# Patient Record
Sex: FEMALE | Race: WHITE | Employment: FULL TIME | URBAN - METROPOLITAN AREA
[De-identification: names, ages, dates, MRNs, and addresses within clinical notes are randomized per-mention and may not be internally consistent; named-entity substitution may affect disease eponyms.]

---

## 2017-12-25 ENCOUNTER — APPOINTMENT (OUTPATIENT)
Dept: GENERAL RADIOLOGY | Facility: HOSPITAL | Age: 37
End: 2017-12-25
Attending: EMERGENCY MEDICINE
Payer: COMMERCIAL

## 2017-12-25 ENCOUNTER — HOSPITAL ENCOUNTER (EMERGENCY)
Facility: HOSPITAL | Age: 37
Discharge: HOME | End: 2017-12-25
Attending: EMERGENCY MEDICINE
Payer: COMMERCIAL

## 2017-12-25 VITALS
HEART RATE: 91 BPM | SYSTOLIC BLOOD PRESSURE: 140 MMHG | TEMPERATURE: 99 F | OXYGEN SATURATION: 98 % | DIASTOLIC BLOOD PRESSURE: 86 MMHG | WEIGHT: 171 LBS | HEIGHT: 64 IN | BODY MASS INDEX: 29.19 KG/M2 | RESPIRATION RATE: 18 BRPM

## 2017-12-25 DIAGNOSIS — J45.21 MILD INTERMITTENT ASTHMA WITH EXACERBATION: Primary | ICD-10-CM

## 2017-12-25 PROCEDURE — 71020 XR CHEST PA + LAT CHEST (CPT=71020): CPT | Performed by: EMERGENCY MEDICINE

## 2017-12-25 PROCEDURE — 94640 AIRWAY INHALATION TREATMENT: CPT

## 2017-12-25 PROCEDURE — 99284 EMERGENCY DEPT VISIT MOD MDM: CPT

## 2017-12-25 RX ORDER — LEVALBUTEROL TARTRATE 45 UG/1
AEROSOL, METERED ORAL EVERY 4 HOURS PRN
COMMUNITY
End: 2020-01-17

## 2017-12-25 RX ORDER — LEVALBUTEROL TARTRATE 45 UG/1
1 AEROSOL, METERED ORAL EVERY 6 HOURS PRN
Qty: 1 INHALER | Refills: 0 | Status: SHIPPED | OUTPATIENT
Start: 2017-12-25 | End: 2020-01-17

## 2017-12-25 RX ORDER — IPRATROPIUM BROMIDE AND ALBUTEROL SULFATE 2.5; .5 MG/3ML; MG/3ML
3 SOLUTION RESPIRATORY (INHALATION) ONCE
Status: COMPLETED | OUTPATIENT
Start: 2017-12-25 | End: 2017-12-25

## 2017-12-25 RX ORDER — PREDNISONE 20 MG/1
40 TABLET ORAL DAILY
Qty: 10 TABLET | Refills: 0 | Status: SHIPPED | OUTPATIENT
Start: 2017-12-25 | End: 2017-12-30

## 2017-12-25 RX ORDER — FLUOXETINE 10 MG/1
10 CAPSULE ORAL DAILY
COMMUNITY
End: 2020-05-21 | Stop reason: ALTCHOICE

## 2017-12-25 NOTE — ED INITIAL ASSESSMENT (HPI)
Patient reports increased shortness of breath since last Wednesday along with a productive cough. Denies fever, N/V/D. Patient relates inhaler use today ineffective.

## 2017-12-25 NOTE — ED PROVIDER NOTES
Patient Seen in: [facility] Emergency Department    History   Patient presents with:  Dyspnea LIZ SOB (respiratory)    Stated Complaint: asthma, sob    HPI    35-year-old white female with a history of asthma presents emergently for complaint of dif clear.  Sclerae anicteric. Neck is supple. Lungs are diminished with diffuse scattered wheezes to auscultation bilaterally.   Heart is regular rate and rhythm without murmur gallop or rub    Abdomen is soft nondistended nontender to deep palpation there

## 2019-03-12 ENCOUNTER — LAB ENCOUNTER (OUTPATIENT)
Dept: LAB | Age: 39
End: 2019-03-12
Attending: PHYSICIAN ASSISTANT
Payer: COMMERCIAL

## 2019-03-12 DIAGNOSIS — L30.9 DERMATITIS: ICD-10-CM

## 2019-03-12 PROCEDURE — 87070 CULTURE OTHR SPECIMN AEROBIC: CPT

## 2019-03-12 PROCEDURE — 87205 SMEAR GRAM STAIN: CPT

## 2019-03-12 PROCEDURE — 87077 CULTURE AEROBIC IDENTIFY: CPT

## 2019-05-14 PROCEDURE — 88175 CYTOPATH C/V AUTO FLUID REDO: CPT | Performed by: OBSTETRICS & GYNECOLOGY

## 2019-05-14 PROCEDURE — 87624 HPV HI-RISK TYP POOLED RSLT: CPT | Performed by: OBSTETRICS & GYNECOLOGY

## 2021-07-21 PROBLEM — Z91.89 AT HIGH RISK FOR BREAST CANCER: Status: ACTIVE | Noted: 2021-07-21

## 2021-08-26 PROBLEM — O09.529 ANTEPARTUM MULTIGRAVIDA OF ADVANCED MATERNAL AGE: Status: ACTIVE | Noted: 2021-08-26

## 2021-08-26 PROBLEM — O09.819 PREGNANCY RESULTING FROM ASSISTED REPRODUCTIVE TECHNOLOGY, ANTEPARTUM: Status: ACTIVE | Noted: 2021-08-26

## 2021-11-09 NOTE — PROGRESS NOTES
Outpatient Maternal-Fetal Medicine Consultation    Dear Dr. Sampson Clark,    Thank you for requesting ultrasound evaluation and maternal fetal medicine consultation on your patient Matt Curiel.   As you are aware she is a 39year old female with a [place] Erythromycin              Minocycline                   PHYSICAL EXAMINATION:  /81 (BP Location: Left arm, Patient Position: Sitting, Cuff Size: large)   Pulse 73   Ht 5' 4\" (1.626 m)   Wt 178 lb (80.7 kg)   LMP 07/01/2021   BMI 30.55 kg/m²   Gene more than adequate to assess for gestational diabetes and preeclampsia; hence, no further significant alterations in obstetric care are advised.     Medical Complications    Women 28years of age or older can expect to experience two to three fold higher ra genetic amniocentesis). Non-invasive Pregnancy Testing (NIPT) - we reviewed her low risk cell free DNA screen. We discussed her low risk cell free DNA screen and her normal level II ultrasound today.   We discussed her option for amniocentesis but that SNRIs during pregnancy be individualized; treatment of depression during pregnancy should incorporate the clinical expertise of the mental health clinician, obstetrician, primary healthcare provider, and pediatrician (ACOG, 2007).  The ACOG also recommend t increased rate of first trimester spontaneous . The risk of complications during pregnancy is lower in women with subclinical, rather than overt hypothyroidism.  However, in some studies, women with subclinical hypothyroidism were also reported to ultrasound in the third trimester and as needed to assess growth and potential goiter. Her TSH was 0.47 on 9/11/2021. She is continuing to take levothyroxine 100 mcg daily.     Assisted Reproductive technology -   Conception by IVF is associated with ( mg daily)  · Fetal echocardiogram in 4 weeks  · Monitor TSH every 8 weeks or more frequently if dose adjustments are required    Thank you for allowing me to participate in the care of your patient.   Please do not hesitate to contact me if addition

## 2021-11-16 ENCOUNTER — ULTRASOUND ENCOUNTER (OUTPATIENT)
Dept: PERINATAL CARE | Facility: HOSPITAL | Age: 41
End: 2021-11-16
Attending: OBSTETRICS & GYNECOLOGY
Payer: COMMERCIAL

## 2021-11-16 VITALS
HEART RATE: 73 BPM | WEIGHT: 178 LBS | BODY MASS INDEX: 30.39 KG/M2 | SYSTOLIC BLOOD PRESSURE: 124 MMHG | HEIGHT: 64 IN | DIASTOLIC BLOOD PRESSURE: 81 MMHG

## 2021-11-16 DIAGNOSIS — O09.819 ENCOUNTER FOR SUPERVISION OF PREGNANCY RESULTING FROM ASSISTED REPRODUCTIVE TECHNOLOGY: ICD-10-CM

## 2021-11-16 DIAGNOSIS — F33.40 RECURRENT MAJOR DEPRESSIVE DISORDER, IN REMISSION (HCC): ICD-10-CM

## 2021-11-16 DIAGNOSIS — O09.529 ANTEPARTUM MULTIGRAVIDA OF ADVANCED MATERNAL AGE: Primary | ICD-10-CM

## 2021-11-16 DIAGNOSIS — O09.819 PREGNANCY RESULTING FROM ASSISTED REPRODUCTIVE TECHNOLOGY, ANTEPARTUM: ICD-10-CM

## 2021-11-16 DIAGNOSIS — E03.9 HYPOTHYROIDISM, UNSPECIFIED TYPE: ICD-10-CM

## 2021-11-16 DIAGNOSIS — O09.529 ANTEPARTUM MULTIGRAVIDA OF ADVANCED MATERNAL AGE: ICD-10-CM

## 2021-11-16 PROCEDURE — 76811 OB US DETAILED SNGL FETUS: CPT | Performed by: OBSTETRICS & GYNECOLOGY

## 2021-11-16 PROCEDURE — 99244 OFF/OP CNSLTJ NEW/EST MOD 40: CPT | Performed by: OBSTETRICS & GYNECOLOGY

## 2021-12-13 ENCOUNTER — OFFICE VISIT (OUTPATIENT)
Dept: PERINATAL CARE | Facility: HOSPITAL | Age: 41
End: 2021-12-13
Attending: OBSTETRICS & GYNECOLOGY
Payer: COMMERCIAL

## 2021-12-13 VITALS
BODY MASS INDEX: 31.41 KG/M2 | HEART RATE: 76 BPM | DIASTOLIC BLOOD PRESSURE: 90 MMHG | WEIGHT: 184 LBS | HEIGHT: 64 IN | SYSTOLIC BLOOD PRESSURE: 142 MMHG

## 2021-12-13 DIAGNOSIS — O09.529 ANTEPARTUM MULTIGRAVIDA OF ADVANCED MATERNAL AGE: ICD-10-CM

## 2021-12-13 DIAGNOSIS — O09.819 PREGNANCY RESULTING FROM ASSISTED REPRODUCTIVE TECHNOLOGY, ANTEPARTUM: ICD-10-CM

## 2021-12-13 DIAGNOSIS — O09.819 PREGNANCY RESULTING FROM ASSISTED REPRODUCTIVE TECHNOLOGY, ANTEPARTUM: Primary | ICD-10-CM

## 2021-12-13 PROCEDURE — 93325 DOPPLER ECHO COLOR FLOW MAPG: CPT

## 2021-12-13 PROCEDURE — 76825 ECHO EXAM OF FETAL HEART: CPT | Performed by: OBSTETRICS & GYNECOLOGY

## 2021-12-13 PROCEDURE — 76827 ECHO EXAM OF FETAL HEART: CPT | Performed by: OBSTETRICS & GYNECOLOGY

## 2021-12-13 PROCEDURE — 76827 ECHO EXAM OF FETAL HEART: CPT

## 2021-12-13 PROCEDURE — 99214 OFFICE O/P EST MOD 30 MIN: CPT | Performed by: OBSTETRICS & GYNECOLOGY

## 2021-12-13 PROCEDURE — 93325 DOPPLER ECHO COLOR FLOW MAPG: CPT | Performed by: OBSTETRICS & GYNECOLOGY

## 2021-12-13 NOTE — PROGRESS NOTES
Outpatient Maternal-Fetal Medicine Consultation    Dear Dr. Patricio Cosme,    Thank you for requesting ultrasound evaluation and maternal fetal medicine consultation on your patient Doug Lines.   As you are aware she is a 39year old female with a [place] Minocycline                   PHYSICAL EXAMINATION:  /82 (BP Location: Left arm, Patient Position: Sitting, Cuff Size: large)   Pulse 75   Ht 5' 4\" (1.626 m)   Wt 184 lb (83.5 kg)   LMP 07/01/2021   BMI 31.58 kg/m²   General: alert and oriente at increased risk of delivering offspring with congenital malformations compared with fertile women who conceive naturally. Heart defects have been reported as high at 6 % so fetal echocardiography is recommended in all IVF patients.  Stillbirth and perinat valves appear normal.  There is no evidence of pericardial or pleural effusion. The A-V conduction is one to one and the heart rate is appropriate for gestational age. No evidence of fetal arrhythmias is seen during today's study.   There is a right to [employer]

## 2021-12-13 NOTE — PROGRESS NOTES
Pt here for fetal echo  +FM  Pt reports nausea, hip pain and tingling of her hands bilaterally when she wakes up

## 2021-12-28 PROBLEM — E03.9 HYPOTHYROIDISM: Status: ACTIVE | Noted: 2021-12-28

## 2022-01-24 ENCOUNTER — HOSPITAL ENCOUNTER (INPATIENT)
Facility: HOSPITAL | Age: 42
LOS: 4 days | Discharge: HOME | End: 2022-01-28
Attending: OBSTETRICS & GYNECOLOGY | Admitting: OBSTETRICS & GYNECOLOGY
Payer: COMMERCIAL

## 2022-01-24 ENCOUNTER — ANESTHESIA EVENT (OUTPATIENT)
Dept: OBGYN UNIT | Facility: HOSPITAL | Age: 42
End: 2022-01-24
Payer: COMMERCIAL

## 2022-01-24 ENCOUNTER — ANESTHESIA (OUTPATIENT)
Dept: OBGYN UNIT | Facility: HOSPITAL | Age: 42
End: 2022-01-24
Payer: COMMERCIAL

## 2022-01-24 PROBLEM — O14.93 PREECLAMPSIA, THIRD TRIMESTER: Status: ACTIVE | Noted: 2022-01-24

## 2022-01-24 PROBLEM — Z34.90 PREGNANCY: Status: ACTIVE | Noted: 2022-01-24

## 2022-01-24 LAB
ALBUMIN SERPL-MCNC: 2.6 G/DL (ref 3.4–5)
ALBUMIN/GLOB SERPL: 0.7 {RATIO} (ref 1–2)
ALP LIVER SERPL-CCNC: 96 U/L
ALT SERPL-CCNC: 20 U/L
ANION GAP SERPL CALC-SCNC: 9 MMOL/L (ref 0–18)
ANTIBODY SCREEN: NEGATIVE
AST SERPL-CCNC: 25 U/L (ref 15–37)
BASOPHILS # BLD AUTO: 0.05 X10(3) UL (ref 0–0.2)
BASOPHILS NFR BLD AUTO: 0.4 %
BILIRUB SERPL-MCNC: 0.2 MG/DL (ref 0.1–2)
BUN BLD-MCNC: 14 MG/DL (ref 7–18)
CALCIUM BLD-MCNC: 9.1 MG/DL (ref 8.5–10.1)
CHLORIDE SERPL-SCNC: 103 MMOL/L (ref 98–112)
CO2 SERPL-SCNC: 19 MMOL/L (ref 21–32)
CREAT BLD-MCNC: 0.77 MG/DL
CREAT UR-SCNC: 171 MG/DL
EOSINOPHIL # BLD AUTO: 0.17 X10(3) UL (ref 0–0.7)
EOSINOPHIL NFR BLD AUTO: 1.4 %
ERYTHROCYTE [DISTWIDTH] IN BLOOD BY AUTOMATED COUNT: 13 %
FASTING STATUS PATIENT QL REPORTED: NO
GLOBULIN PLAS-MCNC: 3.6 G/DL (ref 2.8–4.4)
GLUCOSE BLD-MCNC: 105 MG/DL (ref 70–99)
HCT VFR BLD AUTO: 34.7 %
HGB BLD-MCNC: 12.2 G/DL
IMM GRANULOCYTES # BLD AUTO: 0.14 X10(3) UL (ref 0–1)
IMM GRANULOCYTES NFR BLD: 1.1 %
LYMPHOCYTES # BLD AUTO: 1.83 X10(3) UL (ref 1–4)
LYMPHOCYTES NFR BLD AUTO: 14.6 %
MCH RBC QN AUTO: 31 PG (ref 26–34)
MCHC RBC AUTO-ENTMCNC: 35.2 G/DL (ref 31–37)
MCV RBC AUTO: 88.3 FL
MONOCYTES # BLD AUTO: 1.45 X10(3) UL (ref 0.1–1)
MONOCYTES NFR BLD AUTO: 11.5 %
NEODAT: NEGATIVE
NEUTROPHILS # BLD AUTO: 8.92 X10 (3) UL (ref 1.5–7.7)
NEUTROPHILS # BLD AUTO: 8.92 X10(3) UL (ref 1.5–7.7)
NEUTROPHILS NFR BLD AUTO: 71 %
OSMOLALITY SERPL CALC.SUM OF ELEC: 273 MOSM/KG (ref 275–295)
PLATELET # BLD AUTO: 248 10(3)UL (ref 150–450)
POTASSIUM SERPL-SCNC: 4 MMOL/L (ref 3.5–5.1)
PROT SERPL-MCNC: 6.2 G/DL (ref 6.4–8.2)
PROT UR-MCNC: 1207.7 MG/DL
PROT/CREAT UR-RTO: 7.06
RBC # BLD AUTO: 3.93 X10(6)UL
RH BLOOD TYPE: POSITIVE
RH BLOOD TYPE: POSITIVE
SODIUM SERPL-SCNC: 131 MMOL/L (ref 136–145)
T PALLIDUM AB SER QL IA: NONREACTIVE
URATE SERPL-MCNC: 5.6 MG/DL
WBC # BLD AUTO: 12.6 X10(3) UL (ref 4–11)

## 2022-01-24 PROCEDURE — 59514 CESAREAN DELIVERY ONLY: CPT | Performed by: OBSTETRICS & GYNECOLOGY

## 2022-01-24 RX ORDER — LABETALOL HYDROCHLORIDE 5 MG/ML
20 INJECTION, SOLUTION INTRAVENOUS ONCE AS NEEDED
Status: COMPLETED | OUTPATIENT
Start: 2022-01-24 | End: 2022-01-24

## 2022-01-24 RX ORDER — LABETALOL HYDROCHLORIDE 5 MG/ML
80 INJECTION, SOLUTION INTRAVENOUS ONCE AS NEEDED
Status: COMPLETED | OUTPATIENT
Start: 2022-01-24 | End: 2022-01-24

## 2022-01-24 RX ORDER — METOCLOPRAMIDE HYDROCHLORIDE 5 MG/ML
INJECTION INTRAMUSCULAR; INTRAVENOUS AS NEEDED
Status: DISCONTINUED | OUTPATIENT
Start: 2022-01-24 | End: 2022-01-24 | Stop reason: SURG

## 2022-01-24 RX ORDER — NALBUPHINE HCL 10 MG/ML
2.5 AMPUL (ML) INJECTION
Status: DISCONTINUED | OUTPATIENT
Start: 2022-01-24 | End: 2022-01-28

## 2022-01-24 RX ORDER — ACETAMINOPHEN 500 MG
1000 TABLET ORAL ONCE
Status: COMPLETED | OUTPATIENT
Start: 2022-01-24 | End: 2022-01-24

## 2022-01-24 RX ORDER — ASPIRIN 325 MG
120 TABLET ORAL DAILY
COMMUNITY
End: 2022-01-28

## 2022-01-24 RX ORDER — NALBUPHINE HCL 10 MG/ML
2.5 AMPUL (ML) INJECTION EVERY 4 HOURS PRN
Status: DISCONTINUED | OUTPATIENT
Start: 2022-01-24 | End: 2022-01-28

## 2022-01-24 RX ORDER — CEFAZOLIN SODIUM/WATER 2 G/20 ML
2 SYRINGE (ML) INTRAVENOUS ONCE
Status: COMPLETED | OUTPATIENT
Start: 2022-01-24 | End: 2022-01-24

## 2022-01-24 RX ORDER — ACETAMINOPHEN 500 MG
TABLET ORAL
Status: COMPLETED
Start: 2022-01-24 | End: 2022-01-24

## 2022-01-24 RX ORDER — DIPHENHYDRAMINE HYDROCHLORIDE 50 MG/ML
12.5 INJECTION INTRAMUSCULAR; INTRAVENOUS EVERY 4 HOURS PRN
Status: DISCONTINUED | OUTPATIENT
Start: 2022-01-24 | End: 2022-01-28

## 2022-01-24 RX ORDER — SODIUM CHLORIDE, SODIUM LACTATE, POTASSIUM CHLORIDE, CALCIUM CHLORIDE 600; 310; 30; 20 MG/100ML; MG/100ML; MG/100ML; MG/100ML
INJECTION, SOLUTION INTRAVENOUS CONTINUOUS
Status: DISCONTINUED | OUTPATIENT
Start: 2022-01-24 | End: 2022-01-25

## 2022-01-24 RX ORDER — NALOXONE HYDROCHLORIDE 0.4 MG/ML
0.08 INJECTION, SOLUTION INTRAMUSCULAR; INTRAVENOUS; SUBCUTANEOUS
Status: ACTIVE | OUTPATIENT
Start: 2022-01-24 | End: 2022-01-25

## 2022-01-24 RX ORDER — BETAMETHASONE SODIUM PHOSPHATE AND BETAMETHASONE ACETATE 3; 3 MG/ML; MG/ML
12 INJECTION, SUSPENSION INTRA-ARTICULAR; INTRALESIONAL; INTRAMUSCULAR; SOFT TISSUE EVERY 24 HOURS
Status: DISCONTINUED | OUTPATIENT
Start: 2022-01-24 | End: 2022-01-25

## 2022-01-24 RX ORDER — MORPHINE SULFATE 2 MG/ML
INJECTION, SOLUTION INTRAMUSCULAR; INTRAVENOUS AS NEEDED
Status: DISCONTINUED | OUTPATIENT
Start: 2022-01-24 | End: 2022-01-24 | Stop reason: SURG

## 2022-01-24 RX ORDER — KETOROLAC TROMETHAMINE 30 MG/ML
30 INJECTION, SOLUTION INTRAMUSCULAR; INTRAVENOUS ONCE AS NEEDED
Status: ACTIVE | OUTPATIENT
Start: 2022-01-24 | End: 2022-01-24

## 2022-01-24 RX ORDER — BUPIVACAINE HYDROCHLORIDE 7.5 MG/ML
INJECTION, SOLUTION INTRASPINAL AS NEEDED
Status: DISCONTINUED | OUTPATIENT
Start: 2022-01-24 | End: 2022-01-24 | Stop reason: SURG

## 2022-01-24 RX ORDER — CALCIUM GLUCONATE 94 MG/ML
1 INJECTION, SOLUTION INTRAVENOUS ONCE AS NEEDED
Status: ACTIVE | OUTPATIENT
Start: 2022-01-24 | End: 2022-01-24

## 2022-01-24 RX ORDER — PHENYLEPHRINE HCL 10 MG/ML
VIAL (ML) INJECTION AS NEEDED
Status: DISCONTINUED | OUTPATIENT
Start: 2022-01-24 | End: 2022-01-24 | Stop reason: SURG

## 2022-01-24 RX ORDER — HYDRALAZINE HYDROCHLORIDE 20 MG/ML
10 INJECTION INTRAMUSCULAR; INTRAVENOUS ONCE AS NEEDED
Status: COMPLETED | OUTPATIENT
Start: 2022-01-24 | End: 2022-01-24

## 2022-01-24 RX ORDER — SODIUM CHLORIDE, SODIUM LACTATE, POTASSIUM CHLORIDE, CALCIUM CHLORIDE 600; 310; 30; 20 MG/100ML; MG/100ML; MG/100ML; MG/100ML
INJECTION, SOLUTION INTRAVENOUS CONTINUOUS PRN
Status: DISCONTINUED | OUTPATIENT
Start: 2022-01-24 | End: 2022-01-24 | Stop reason: SURG

## 2022-01-24 RX ORDER — CALCIUM GLUCONATE 94 MG/ML
1 INJECTION, SOLUTION INTRAVENOUS ONCE AS NEEDED
Status: DISCONTINUED | OUTPATIENT
Start: 2022-01-24 | End: 2022-01-25

## 2022-01-24 RX ORDER — TRISODIUM CITRATE DIHYDRATE AND CITRIC ACID MONOHYDRATE 500; 334 MG/5ML; MG/5ML
SOLUTION ORAL
Status: COMPLETED
Start: 2022-01-24 | End: 2022-01-24

## 2022-01-24 RX ORDER — DIPHENHYDRAMINE HCL 25 MG
25 CAPSULE ORAL EVERY 4 HOURS PRN
Status: DISCONTINUED | OUTPATIENT
Start: 2022-01-24 | End: 2022-01-28

## 2022-01-24 RX ORDER — LIDOCAINE HYDROCHLORIDE 10 MG/ML
INJECTION, SOLUTION EPIDURAL; INFILTRATION; INTRACAUDAL; PERINEURAL AS NEEDED
Status: DISCONTINUED | OUTPATIENT
Start: 2022-01-24 | End: 2022-01-24 | Stop reason: SURG

## 2022-01-24 RX ORDER — ONDANSETRON 2 MG/ML
4 INJECTION INTRAMUSCULAR; INTRAVENOUS EVERY 6 HOURS PRN
Status: DISCONTINUED | OUTPATIENT
Start: 2022-01-24 | End: 2022-01-28

## 2022-01-24 RX ORDER — ONDANSETRON 2 MG/ML
4 INJECTION INTRAMUSCULAR; INTRAVENOUS ONCE AS NEEDED
Status: ACTIVE | OUTPATIENT
Start: 2022-01-24 | End: 2022-01-24

## 2022-01-24 RX ORDER — LABETALOL HYDROCHLORIDE 5 MG/ML
40 INJECTION, SOLUTION INTRAVENOUS ONCE AS NEEDED
Status: COMPLETED | OUTPATIENT
Start: 2022-01-24 | End: 2022-01-24

## 2022-01-24 RX ORDER — ONDANSETRON 2 MG/ML
INJECTION INTRAMUSCULAR; INTRAVENOUS AS NEEDED
Status: DISCONTINUED | OUTPATIENT
Start: 2022-01-24 | End: 2022-01-24 | Stop reason: SURG

## 2022-01-24 RX ORDER — EPHEDRINE SULFATE 50 MG/ML
INJECTION INTRAVENOUS AS NEEDED
Status: DISCONTINUED | OUTPATIENT
Start: 2022-01-24 | End: 2022-01-24 | Stop reason: SURG

## 2022-01-24 RX ORDER — TRISODIUM CITRATE DIHYDRATE AND CITRIC ACID MONOHYDRATE 500; 334 MG/5ML; MG/5ML
30 SOLUTION ORAL ONCE
Status: COMPLETED | OUTPATIENT
Start: 2022-01-24 | End: 2022-01-24

## 2022-01-24 RX ORDER — DEXAMETHASONE SODIUM PHOSPHATE 4 MG/ML
VIAL (ML) INJECTION AS NEEDED
Status: DISCONTINUED | OUTPATIENT
Start: 2022-01-24 | End: 2022-01-24 | Stop reason: SURG

## 2022-01-24 RX ORDER — DIPHENHYDRAMINE HYDROCHLORIDE 50 MG/ML
25 INJECTION INTRAMUSCULAR; INTRAVENOUS ONCE AS NEEDED
Status: ACTIVE | OUTPATIENT
Start: 2022-01-24 | End: 2022-01-24

## 2022-01-24 RX ORDER — SODIUM CHLORIDE, SODIUM LACTATE, POTASSIUM CHLORIDE, CALCIUM CHLORIDE 600; 310; 30; 20 MG/100ML; MG/100ML; MG/100ML; MG/100ML
125 INJECTION, SOLUTION INTRAVENOUS CONTINUOUS
Status: DISCONTINUED | OUTPATIENT
Start: 2022-01-24 | End: 2022-01-25 | Stop reason: HOSPADM

## 2022-01-24 RX ORDER — CEFAZOLIN SODIUM/WATER 2 G/20 ML
SYRINGE (ML) INTRAVENOUS
Status: DISPENSED
Start: 2022-01-24 | End: 2022-01-25

## 2022-01-24 RX ORDER — ONDANSETRON 2 MG/ML
4 INJECTION INTRAMUSCULAR; INTRAVENOUS EVERY 6 HOURS PRN
Status: DISCONTINUED | OUTPATIENT
Start: 2022-01-24 | End: 2022-01-25 | Stop reason: HOSPADM

## 2022-01-24 RX ADMIN — EPHEDRINE SULFATE 5 MG: 50 INJECTION INTRAVENOUS at 20:33:00

## 2022-01-24 RX ADMIN — CEFAZOLIN SODIUM/WATER 2 G: 2 G/20 ML SYRINGE (ML) INTRAVENOUS at 20:35:00

## 2022-01-24 RX ADMIN — BUPIVACAINE HYDROCHLORIDE 1.6 ML: 7.5 INJECTION, SOLUTION INTRASPINAL at 20:33:00

## 2022-01-24 RX ADMIN — LIDOCAINE HYDROCHLORIDE 3 ML: 10 INJECTION, SOLUTION EPIDURAL; INFILTRATION; INTRACAUDAL; PERINEURAL at 20:30:00

## 2022-01-24 RX ADMIN — DEXAMETHASONE SODIUM PHOSPHATE 4 MG: 4 MG/ML VIAL (ML) INJECTION at 20:33:00

## 2022-01-24 RX ADMIN — PHENYLEPHRINE HCL 100 MCG: 10 MG/ML VIAL (ML) INJECTION at 20:33:00

## 2022-01-24 RX ADMIN — SODIUM CHLORIDE, SODIUM LACTATE, POTASSIUM CHLORIDE, CALCIUM CHLORIDE: 600; 310; 30; 20 INJECTION, SOLUTION INTRAVENOUS at 20:27:00

## 2022-01-24 RX ADMIN — MORPHINE SULFATE 0.2 MG: 2 INJECTION, SOLUTION INTRAMUSCULAR; INTRAVENOUS at 20:33:00

## 2022-01-24 RX ADMIN — METOCLOPRAMIDE HYDROCHLORIDE 10 MG: 5 INJECTION INTRAMUSCULAR; INTRAVENOUS at 20:33:00

## 2022-01-24 RX ADMIN — ONDANSETRON 4 MG: 2 INJECTION INTRAMUSCULAR; INTRAVENOUS at 20:33:00

## 2022-01-25 LAB
ALBUMIN SERPL-MCNC: 2.1 G/DL (ref 3.4–5)
ALBUMIN/GLOB SERPL: 0.7 {RATIO} (ref 1–2)
ALP LIVER SERPL-CCNC: 79 U/L
ALT SERPL-CCNC: 16 U/L
ANION GAP SERPL CALC-SCNC: 6 MMOL/L (ref 0–18)
AST SERPL-CCNC: 19 U/L (ref 15–37)
BASOPHILS # BLD AUTO: 0.04 X10(3) UL (ref 0–0.2)
BASOPHILS NFR BLD AUTO: 0.2 %
BILIRUB SERPL-MCNC: 0.2 MG/DL (ref 0.1–2)
BUN BLD-MCNC: 19 MG/DL (ref 7–18)
CALCIUM BLD-MCNC: 8.1 MG/DL (ref 8.5–10.1)
CHLORIDE SERPL-SCNC: 103 MMOL/L (ref 98–112)
CO2 SERPL-SCNC: 22 MMOL/L (ref 21–32)
CREAT BLD-MCNC: 0.93 MG/DL
EOSINOPHIL # BLD AUTO: 0.01 X10(3) UL (ref 0–0.7)
EOSINOPHIL NFR BLD AUTO: 0 %
ERYTHROCYTE [DISTWIDTH] IN BLOOD BY AUTOMATED COUNT: 12.9 %
GLOBULIN PLAS-MCNC: 2.9 G/DL (ref 2.8–4.4)
GLUCOSE BLD-MCNC: 148 MG/DL (ref 70–99)
HCT VFR BLD AUTO: 32.7 %
HGB BLD-MCNC: 10.9 G/DL
IMM GRANULOCYTES # BLD AUTO: 0.23 X10(3) UL (ref 0–1)
IMM GRANULOCYTES NFR BLD: 1 %
LYMPHOCYTES # BLD AUTO: 1.15 X10(3) UL (ref 1–4)
LYMPHOCYTES NFR BLD AUTO: 5.1 %
MAGNESIUM SERPL-MCNC: 7.1 MG/DL (ref 1.6–2.6)
MCH RBC QN AUTO: 30.4 PG (ref 26–34)
MCHC RBC AUTO-ENTMCNC: 33.3 G/DL (ref 31–37)
MCV RBC AUTO: 91.3 FL
MONOCYTES # BLD AUTO: 1.34 X10(3) UL (ref 0.1–1)
MONOCYTES NFR BLD AUTO: 5.9 %
NEUTROPHILS # BLD AUTO: 19.81 X10 (3) UL (ref 1.5–7.7)
NEUTROPHILS # BLD AUTO: 19.81 X10(3) UL (ref 1.5–7.7)
NEUTROPHILS NFR BLD AUTO: 87.8 %
OSMOLALITY SERPL CALC.SUM OF ELEC: 277 MOSM/KG (ref 275–295)
PLATELET # BLD AUTO: 223 10(3)UL (ref 150–450)
POTASSIUM SERPL-SCNC: 4.6 MMOL/L (ref 3.5–5.1)
PROT SERPL-MCNC: 5 G/DL (ref 6.4–8.2)
RBC # BLD AUTO: 3.58 X10(6)UL
SODIUM SERPL-SCNC: 131 MMOL/L (ref 136–145)
WBC # BLD AUTO: 22.6 X10(3) UL (ref 4–11)

## 2022-01-25 RX ORDER — ENOXAPARIN SODIUM 100 MG/ML
40 INJECTION SUBCUTANEOUS DAILY
Status: DISCONTINUED | OUTPATIENT
Start: 2022-01-25 | End: 2022-01-28

## 2022-01-25 RX ORDER — ACETAMINOPHEN 500 MG
1000 TABLET ORAL EVERY 6 HOURS
Status: DISCONTINUED | OUTPATIENT
Start: 2022-01-25 | End: 2022-01-28

## 2022-01-25 RX ORDER — IBUPROFEN 600 MG/1
600 TABLET ORAL EVERY 6 HOURS
Status: DISCONTINUED | OUTPATIENT
Start: 2022-01-26 | End: 2022-01-28

## 2022-01-25 RX ORDER — LABETALOL 200 MG/1
200 TABLET, FILM COATED ORAL EVERY 12 HOURS SCHEDULED
Status: DISCONTINUED | OUTPATIENT
Start: 2022-01-25 | End: 2022-01-28

## 2022-01-25 RX ORDER — DOCUSATE SODIUM 100 MG/1
100 CAPSULE, LIQUID FILLED ORAL
Status: DISCONTINUED | OUTPATIENT
Start: 2022-01-26 | End: 2022-01-25

## 2022-01-25 RX ORDER — FLUOXETINE 10 MG/1
10 TABLET, FILM COATED ORAL DAILY
Status: DISCONTINUED | OUTPATIENT
Start: 2022-01-25 | End: 2022-01-28

## 2022-01-25 RX ORDER — FUROSEMIDE 10 MG/ML
20 INJECTION INTRAMUSCULAR; INTRAVENOUS ONCE
Status: COMPLETED | OUTPATIENT
Start: 2022-01-25 | End: 2022-01-25

## 2022-01-25 RX ORDER — LEVOTHYROXINE SODIUM 0.1 MG/1
100 TABLET ORAL
Status: DISCONTINUED | OUTPATIENT
Start: 2022-01-25 | End: 2022-01-28

## 2022-01-25 RX ORDER — KETOROLAC TROMETHAMINE 30 MG/ML
30 INJECTION, SOLUTION INTRAMUSCULAR; INTRAVENOUS EVERY 6 HOURS
Status: COMPLETED | OUTPATIENT
Start: 2022-01-25 | End: 2022-01-25

## 2022-01-25 RX ORDER — ONDANSETRON 2 MG/ML
4 INJECTION INTRAMUSCULAR; INTRAVENOUS EVERY 6 HOURS PRN
Status: DISCONTINUED | OUTPATIENT
Start: 2022-01-25 | End: 2022-01-28

## 2022-01-25 NOTE — PROGRESS NOTES
Dr Jimmy Luong notified via phone of intermittent O2 sats < 95% with O2 2L nc , of HR in 50s, and diminished reflexes.   Order rec'd for STAT magnesium level and to notify MD if > 7 and states may increase O2 as needed to 4 L nc to maintain O2 sats >95

## 2022-01-25 NOTE — PROGRESS NOTES
After 3 doses of IV labetalol and a dose of hydralazine, continued severe range HTN  I explained that given her preeclampsia with severe features, and the inability to reduce her BP, I recommend delivery for maternal indications.   She has received one dose

## 2022-01-25 NOTE — PLAN OF CARE
Problem: ANTEPARTUM/LABOR and DELIVERY  Goal: Anxiety is at manageable level  Description: INTERVENTIONS:  - [place] patient to unit/surroundings  - Establish a trusting relationship with patient  - Discuss possible complications or alterations in birth p

## 2022-01-25 NOTE — PLAN OF CARE
Problem: ANTEPARTUM/LABOR and DELIVERY  Goal: Maintain pregnancy as long as maternal and/or fetal condition is stable  Description: INTERVENTIONS:  - Maternal surveillance  - Fetal surveillance  - Monitor uterine activity  - Medications as ordered  - Bed Short Term Goal: Understand activity parameters appropriate for diagnosis.     Interventions:   - Education and patient demonstrates comprehension.  - Patient verbalizes understanding.   - See additional Care Plan goals for specific interventions        Int environment and orient to surroundings.   Provide education on s/s of complications and recognition.    - See additional Care Plan goals for specific interventions           Interventions:  -   - See additional Care Plan goals for specific interventions  Ou

## 2022-01-25 NOTE — PROGRESS NOTES
Report received from PAULINO ARITA [facility]. Dr Eleanor Melton called re: severe range Bps.   Order rec'd for Hydralazine 5 mg IVP and call MD 30 min after med admin with BPs

## 2022-01-25 NOTE — PROGRESS NOTES
DR Steven Hogue notified via phone of mag level 7.1, current BP and current o2 sats on 3L O2 NC. MD states to turn magnesium off x2 hours, then turn on at 1gm/hour.

## 2022-01-25 NOTE — ANESTHESIA POSTPROCEDURE EVALUATION
[address] [facility] Patient Status:  Inpatient   Age/Gender 39year old female MRN [id number]   Location [address] [facility] Attending Yasmeen Lrasen MD   [facility] Day # 0 PCP Deena Miller MD       Anesthesia Post-op Note

## 2022-01-25 NOTE — PLAN OF CARE
Problem: ANTEPARTUM/LABOR and DELIVERY  Goal: Anxiety is at manageable level  Description: INTERVENTIONS:  - [place] patient to unit/surroundings  - Establish a trusting relationship with patient  - Discuss possible complications or alterations in birth p

## 2022-01-25 NOTE — OPERATIVE REPORT
PREOPERATIVE DIAGNOSIS:   1. 30 week pregnancy  2. preeclampsia with severe features  3. Fetal malpresentation    POSTOPERATIVE DIAGNOSIS:   same    PROCEDURE PERFORMED: primary low transverse  section.      SURGEON: Cullen Black MD    ASSISTANT: first continuous locking and next continuous imbricating. Isolated hemostatic sutures and   electrosurgery used to complete hemostasis. Gutters were cleared.  We rechecked the uterine and bladder flap hemostasis and with minimal cautery found it to be   g

## 2022-01-25 NOTE — PAYOR COMM NOTE
--------------  ADMISSION REVIEW     Payor: MOISES SALGADO  Subscriber #:  [id number]  Authorization Number: [id number]    Admit date: 1/24/22  Admit time:  5:46 PM       REVIEW DOCUMENTATION:         H&P - H&P Note      H&P signed by Isabella Cortez MD at 1 blood pressure, we have started magnesium sulfate for seizure prophylaxis. BP is being followed closely; I explained that if stability is not achieve, we will proceed to delivery    - fetal status; betamethasone given for prematurity. Transverse lie;  [place] Intravenous Ignacio Culp MD      dexamethasone Sodium Phosphate (DECADRON) 4 MG/ML injection     Date Action Dose Route User    1/24/2022 2033 Given 4 mg Intravenous Ignacio Culp MD      enoxaparin (LOVENOX) 40 MG/0.4ML injection 40 mg     Date Action Dose 1/24/2022 2027 New Bag (none) Intravenous Sandi Aguilar MD      levothyroxine (SYNTHROID) tab 100 mcg     Date Action Dose Route User    1/25/2022 1056 Given 100 mcg Oral Arsenio Miles RN      lidocaine PF (XYLOCAINE) 1% injection     Date Action Dose R RN      oxyTOCIN (PITOCIN) 30 units/ 500 ml 0.9% NS premix infusion     Date Action Dose Route User    1/24/2022 215 New Bag 62.5 mL/hr Intravenous Bhavna Oh RN      oxyTOCIN (PITOCIN) 30 units/ 500 ml 0.9% NS premix infusion     Date Action Dose R — 202 lb — — [place]    01/24/22 1630 — 83 — 183/104 — — — — [place]        Component   Ref Range & Units 1/24/22 1613   Total Protein Urine Random   mg/dL 1,207.7    Creatinine Ur Random   mg/dL 171.00    Urine Protein/Creatinine Ratio, Random  7.06      Component

## 2022-01-25 NOTE — ANESTHESIA PROCEDURE NOTES
Spinal Block  Performed by: Ignacio Culp MD  Authorized by: Ignacio Culp MD       General Information and Staff    Start Time:  1/24/2022 8:28 PM  End Time:  1/24/2022 8:33 PM  Anesthesiologist:  Ignacio Culp MD  Performed by:   Anesthesiologist  Patient Lo

## 2022-01-25 NOTE — PROGRESS NOTES
[address] [facility] juan pablo Rogers Patient Status:  Inpatient   Age/Gender 39year old female MRN [id number]   Location [address] Attending Og Quiles MD   [facility] Day # 1 PCP Radha Aquino MD     C-Sect

## 2022-01-25 NOTE — H&P
[address] and Delivery Prenatal History and Physical Interval Addendum  Please see full Prenatal Record for this pregnancy      SUBJECTIVE:    Interval History: This is a pregnancy at 30 weeks admitted for preeclampsia.     Pt presented to

## 2022-01-25 NOTE — ANESTHESIA PREPROCEDURE EVALUATION
PRE-OP EVALUATION    Patient Name: Wilfredo Artis    Admit Diagnosis: [place]  Preeclampsia, third trimester  Pregnancy    Pre-op Diagnosis: * No pre-op diagnosis entered *     SECTION    Anesthesia Procedure:  SECTION (N/A ) Continuous  ceFAZolin (ANCEF/KEFZOL) 2 GM/20ML premix IV syringe 2 g, 2 g, Intravenous, Once  sodium citrate-citric acid (BICITRA) 500-334 MG/5ML solution, , ,   oxyTOCIN (PITOCIN) 30 units/ 500 ml 0.9% NS premix infusion, , ,   ceFAZolin (ANCEF/KEFZOL) 2 Component Value Date    WBC 12.6 (H) 01/24/2022    WBC 12.13 11/12/2021    RBC 3.93 01/24/2022    RBC 3.82 11/12/2021    HGB 12.2 01/24/2022    HGB 11.4 (L) 01/07/2022    HCT 34.7 (L) 01/24/2022    HCT 33.7 (L) 01/07/2022    MCV 88.3 01/24/2022    MCV 91

## 2022-01-26 RX ORDER — BISACODYL 10 MG
10 SUPPOSITORY, RECTAL RECTAL ONCE AS NEEDED
Status: DISCONTINUED | OUTPATIENT
Start: 2022-01-26 | End: 2022-01-28

## 2022-01-26 RX ORDER — GABAPENTIN 300 MG/1
300 CAPSULE ORAL EVERY 8 HOURS PRN
Status: DISCONTINUED | OUTPATIENT
Start: 2022-01-26 | End: 2022-01-28

## 2022-01-26 RX ORDER — DOCUSATE SODIUM 100 MG/1
100 CAPSULE, LIQUID FILLED ORAL
Status: DISCONTINUED | OUTPATIENT
Start: 2022-01-26 | End: 2022-01-28

## 2022-01-26 RX ORDER — ACETAMINOPHEN 500 MG
1000 TABLET ORAL EVERY 6 HOURS
Status: DISCONTINUED | OUTPATIENT
Start: 2022-01-26 | End: 2022-01-27

## 2022-01-26 RX ORDER — SIMETHICONE 80 MG
80 TABLET,CHEWABLE ORAL 3 TIMES DAILY PRN
Status: DISCONTINUED | OUTPATIENT
Start: 2022-01-26 | End: 2022-01-28

## 2022-01-26 NOTE — PLAN OF CARE
Problem: ANTEPARTUM/LABOR and DELIVERY  Goal: Anxiety is at manageable level  Description: INTERVENTIONS:  - [place] patient to unit/surroundings  - Establish a trusting relationship with patient  - Discuss possible complications or alterations in birth p

## 2022-01-26 NOTE — PROGRESS NOTES
NURSING ADMISSION NOTE      Patient admitted via Wheelchair after visiting baby in NICU. Oriented to room. Safety precautions initiated. Bed in low position. Call light in reach.

## 2022-01-26 NOTE — PROGRESS NOTES
[facility]  Post-Partum Caesarean Section Progress Note    Polina Chatman Patient Status:  Inpatient    1980 MRN [id number]   [facility] 1SW-J Attending Jeni Hearn MD   [address] Day # 2 PCP Robyn Murray MD     [address]

## 2022-01-26 NOTE — PROGRESS NOTES
Mady-care taught and completed. Gown changed. Patient transferred via cart to mother/baby after visiting with  in NICU, stable, with all belongings, accompanied by significant other. Report given to mother/baby PREET Alcanatra.

## 2022-01-27 RX ORDER — MELATONIN
325
Status: DISCONTINUED | OUTPATIENT
Start: 2022-01-27 | End: 2022-01-28

## 2022-01-27 NOTE — PROGRESS NOTES
[facility]  Post-Partum Caesarean Section Progress Note    Ivett Grajeda Patient Status:  Inpatient    1980 MRN [id number]   [facility] 1SW-J Attending Yuridia Garg MD   [facility] Day # 3 PCP Nathan Charlton MD     [address]

## 2022-01-27 NOTE — CM/SW NOTE
met with Renny Howard (patient) to review insurance and PCP for infant in NICU. Infant will be added to [facility] insurance plan. PCP for infant will be Dr. Meghan Radford plans on breast feeding and has a breast pump at home.  Case manage

## 2022-01-28 VITALS
RESPIRATION RATE: 16 BRPM | SYSTOLIC BLOOD PRESSURE: 148 MMHG | OXYGEN SATURATION: 99 % | TEMPERATURE: 98 F | HEIGHT: 64 IN | HEART RATE: 74 BPM | DIASTOLIC BLOOD PRESSURE: 76 MMHG | WEIGHT: 202 LBS | BODY MASS INDEX: 34.49 KG/M2

## 2022-01-28 RX ORDER — LABETALOL 200 MG/1
200 TABLET, FILM COATED ORAL EVERY 12 HOURS SCHEDULED
Qty: 60 TABLET | Refills: 0 | Status: SHIPPED | OUTPATIENT
Start: 2022-01-28

## 2022-01-28 RX ORDER — OXYCODONE HYDROCHLORIDE 5 MG/1
5 TABLET ORAL EVERY 4 HOURS PRN
Qty: 10 TABLET | Refills: 0 | Status: SHIPPED | OUTPATIENT
Start: 2022-01-28

## 2022-01-28 RX ORDER — LABETALOL 100 MG/1
100 TABLET, FILM COATED ORAL ONCE
Status: DISCONTINUED | OUTPATIENT
Start: 2022-01-28 | End: 2022-01-28

## 2022-01-28 RX ORDER — GABAPENTIN 300 MG/1
300 CAPSULE ORAL EVERY 8 HOURS PRN
Qty: 20 CAPSULE | Refills: 0 | Status: SHIPPED | OUTPATIENT
Start: 2022-01-28

## 2022-01-28 RX ORDER — NALOXONE HYDROCHLORIDE 4 MG/.1ML
4 SPRAY, METERED NASAL AS NEEDED
Qty: 1 KIT | Refills: 0 | Status: SHIPPED | OUTPATIENT
Start: 2022-01-28

## 2022-01-28 NOTE — PROGRESS NOTES
Postop Day 4    Pt without complaints.    /86   Pulse 70   Temp 98 °F (36.7 °C) (Oral)   Resp 20   Ht 5' 4\" (1.626 m)   Wt 202 lb (91.6 kg)   LMP 07/01/2021   SpO2 99%   Breastfeeding Yes   BMI 34.67 kg/m²   Patient Vitals for the past 24 hrs:   BP T

## 2022-01-28 NOTE — DISCHARGE SUMMARY
Reason for Admission: pregnancy, preeclampsia    Hospital Course:   followed by uncomplicated postop course    Complications: none    Disposition: Home or Self Care    Discharge Condition: Good    Discharge Medications: gabapentin, motrin, labetalo

## 2022-01-30 ENCOUNTER — TELEPHONE (OUTPATIENT)
Dept: OBGYN UNIT | Facility: HOSPITAL | Age: 42
End: 2022-01-30

## 2022-01-30 NOTE — PROGRESS NOTES
Reviewed self care w / mom, she verbalizes understanding of instructions reviewed. Encourage to follow up w/ MDs as directed and w/ questions/concerns. Sx of PIH reviewed, cynthia this week. Occas bb, care reviewed. enc to go to ct

## 2022-03-07 PROBLEM — O09.529 ANTEPARTUM MULTIGRAVIDA OF ADVANCED MATERNAL AGE: Status: RESOLVED | Noted: 2021-08-26 | Resolved: 2022-03-07

## 2022-03-07 PROBLEM — O09.819 PREGNANCY RESULTING FROM ASSISTED REPRODUCTIVE TECHNOLOGY, ANTEPARTUM: Status: RESOLVED | Noted: 2021-08-26 | Resolved: 2022-03-07

## 2022-03-07 PROBLEM — Z34.90 PREGNANCY: Status: RESOLVED | Noted: 2022-01-24 | Resolved: 2022-03-07

## 2022-06-08 ENCOUNTER — NURSE ONLY (OUTPATIENT)
Dept: LACTATION | Facility: HOSPITAL | Age: 42
End: 2022-06-08
Attending: OBSTETRICS & GYNECOLOGY
Payer: COMMERCIAL

## 2022-06-17 ENCOUNTER — NURSE ONLY (OUTPATIENT)
Dept: LACTATION | Facility: HOSPITAL | Age: 42
End: 2022-06-17
Attending: OBSTETRICS & GYNECOLOGY
Payer: COMMERCIAL

## 2022-08-19 ENCOUNTER — NURSE ONLY (OUTPATIENT)
Dept: LACTATION | Facility: HOSPITAL | Age: 42
End: 2022-08-19
Attending: OBSTETRICS & GYNECOLOGY
Payer: COMMERCIAL

## 2022-08-19 PROCEDURE — 99213 OFFICE O/P EST LOW 20 MIN: CPT

## 2023-02-06 ENCOUNTER — ORDER TRANSCRIPTION (OUTPATIENT)
Dept: PHYSICAL THERAPY | Facility: HOSPITAL | Age: 43
End: 2023-02-06

## 2023-02-06 DIAGNOSIS — N39.3 STRESS INCONTINENCE OF URINE: Primary | ICD-10-CM

## 2023-02-28 ENCOUNTER — TELEPHONE (OUTPATIENT)
Dept: PHYSICAL THERAPY | Facility: HOSPITAL | Age: 43
End: 2023-02-28

## 2023-04-05 ENCOUNTER — TELEPHONE (OUTPATIENT)
Dept: PHYSICAL THERAPY | Facility: HOSPITAL | Age: 43
End: 2023-04-05

## 2023-05-02 ENCOUNTER — OFFICE VISIT (OUTPATIENT)
Dept: PHYSICAL THERAPY | Age: 43
End: 2023-05-02
Attending: INTERNAL MEDICINE
Payer: COMMERCIAL

## 2023-05-02 DIAGNOSIS — N39.3 STRESS INCONTINENCE OF URINE: ICD-10-CM

## 2023-05-02 PROCEDURE — 97161 PT EVAL LOW COMPLEX 20 MIN: CPT

## 2023-05-02 PROCEDURE — 97110 THERAPEUTIC EXERCISES: CPT

## 2023-05-09 ENCOUNTER — OFFICE VISIT (OUTPATIENT)
Dept: PHYSICAL THERAPY | Age: 43
End: 2023-05-09
Attending: INTERNAL MEDICINE
Payer: COMMERCIAL

## 2023-05-09 PROCEDURE — 97110 THERAPEUTIC EXERCISES: CPT

## 2023-05-09 PROCEDURE — 97140 MANUAL THERAPY 1/> REGIONS: CPT

## 2023-05-16 ENCOUNTER — OFFICE VISIT (OUTPATIENT)
Dept: PHYSICAL THERAPY | Age: 43
End: 2023-05-16
Attending: INTERNAL MEDICINE
Payer: COMMERCIAL

## 2023-05-16 PROCEDURE — 97140 MANUAL THERAPY 1/> REGIONS: CPT

## 2023-05-23 ENCOUNTER — OFFICE VISIT (OUTPATIENT)
Dept: PHYSICAL THERAPY | Age: 43
End: 2023-05-23
Attending: INTERNAL MEDICINE
Payer: COMMERCIAL

## 2023-05-23 PROCEDURE — 97140 MANUAL THERAPY 1/> REGIONS: CPT

## 2023-05-30 ENCOUNTER — OFFICE VISIT (OUTPATIENT)
Dept: PHYSICAL THERAPY | Age: 43
End: 2023-05-30
Attending: INTERNAL MEDICINE
Payer: COMMERCIAL

## 2023-05-30 PROCEDURE — 97110 THERAPEUTIC EXERCISES: CPT

## 2023-06-06 ENCOUNTER — OFFICE VISIT (OUTPATIENT)
Dept: PHYSICAL THERAPY | Age: 43
End: 2023-06-06
Attending: INTERNAL MEDICINE
Payer: COMMERCIAL

## 2023-06-06 PROCEDURE — 97110 THERAPEUTIC EXERCISES: CPT

## 2023-06-13 ENCOUNTER — OFFICE VISIT (OUTPATIENT)
Dept: PHYSICAL THERAPY | Age: 43
End: 2023-06-13
Attending: INTERNAL MEDICINE
Payer: COMMERCIAL

## 2023-06-13 PROCEDURE — 97110 THERAPEUTIC EXERCISES: CPT

## 2023-06-20 ENCOUNTER — APPOINTMENT (OUTPATIENT)
Dept: PHYSICAL THERAPY | Age: 43
End: 2023-06-20
Attending: INTERNAL MEDICINE
Payer: COMMERCIAL

## 2023-06-27 ENCOUNTER — OFFICE VISIT (OUTPATIENT)
Dept: PHYSICAL THERAPY | Age: 43
End: 2023-06-27
Attending: INTERNAL MEDICINE
Payer: COMMERCIAL

## 2023-06-27 PROCEDURE — 97110 THERAPEUTIC EXERCISES: CPT

## 2023-07-05 ENCOUNTER — OFFICE VISIT (OUTPATIENT)
Dept: PHYSICAL THERAPY | Age: 43
End: 2023-07-05
Attending: PEDIATRICS
Payer: COMMERCIAL

## 2023-07-05 PROCEDURE — 97110 THERAPEUTIC EXERCISES: CPT

## 2023-07-05 NOTE — PROGRESS NOTES
Diagnosis:       Pelvic floor weakness  dyspareunia   L hip pain  Referring Provider: No ref. provider found  Date of Evaluation:    5/2/23    Precautions:  None Next MD visit:   none scheduled  Date of Surgery: n/a   Insurance Primary/Secondary: [place] / N/A     # Auth Visits: 8          Progress Summary  Pt has attended 8 visits in [address]. Please sign to cont with 2-3 more appts to progress pt and check in on her progress      Subjective:      only used the dilator once. No issues, no intercourse. Not feeling much pain on the L hip. Doing really well. Pain:  No pain today       Objective:     full relaxation post contraction today on both LA muscles. Assessment:    pt   was able to relax post contractions. Continued with non weight bearing exercises. progressed to standing exercises today- pt required re contractions- did feel a total relaxation post contraction today       Goals:   1. Pt to be I with HEP- met   2. Pt to hold voiding 2-3 hours at a time  3. Pt to report no leakage post void- met  4. Pt to report no leakage on the way to the bathroom-  improved 6/27/23  Does feel with sneezing  5. Pt to demo 10 fast contractions in 10 secs once the LA muscle has decreased in tone   6. Pt to demo 10 sec holds at 10 mv for improved strength and decreased UI    Plan:   cont with standing next visit.  - re-asess and progress as needed   Date: 5/9/2023  TX#: 2/ 8  Date:  5/16/23          TX#: 3/8  Date: 5/30/23                TX#: 4/8  Date:      6/6/23          TX#: 5/8 Date: 6/13/23  Tx#: 6/8 6/23/23 7/8 8/8 7/5/23   MFR B   S/L piriformis STM   Roller at home  Piriformis  Happy baby  Psoas release   Abdulaziz stretch 2 x 30 secs  B    MFR B 10 mins LA  S/L R MFR LA 3 mins  Cat and camel   Abdulaziz stretch 2 x 30 secs    Prayer stretch 3 x 30 secs   MFR R LA   side lying LA and piriformis release  Standing prayer stretch   2 x 30 secs  Mobs R hip lateral and inf glides   L mobs inf and lateral glides 10 x each   Showed her how to do at home   10 x each direction      LA release R  S/L OI and LA  10 mins  5 second contraction  5 fast contractions  Happy baby 2 x 30 secs    Belt mobs B inf and lateral sides 10 x    Ball add with PF contractions  Band GTB 10 x with PF 10 x   Bridge 10 x   Clams 10 x 2  Bent leg fall outs 10 x     90/90 marching 10 x each   Cues for form  Re assessment 5 mins   GTB 10 x  2 abd  Ball add 10 x   Bridge 10 x with  hip abd   Bent leg fall outs 10x   Bridge with ball add squeezes 10 x   Clams GTB 10 x   90/90 marching 10 x  Bridge with marching 10 x   90 /90 10 x tapping   Reformer 3 cords 10 x 2 with PF contractions   Ball over head 10 x   Lateral side walking   Front walking with band 10 x each direction  Kalia Chris and fire hydrants no band 10 x     Prayer stretch 2 x 30 secs  Mobs B hips inf and lateral glides 10 x each   Piriformis stretch 3 x 30 secs B                                   HEP:  Cont with stretches- added law stretch today     Charges:   EX 3        Total Timed Treatment: 45 min  Total Treatment Time: 45 min

## 2023-07-13 ENCOUNTER — APPOINTMENT (OUTPATIENT)
Dept: PHYSICAL THERAPY | Age: 43
End: 2023-07-13
Attending: PEDIATRICS
Payer: COMMERCIAL

## 2023-07-20 ENCOUNTER — OFFICE VISIT (OUTPATIENT)
Dept: PHYSICAL THERAPY | Age: 43
End: 2023-07-20
Attending: INTERNAL MEDICINE
Payer: COMMERCIAL

## 2023-07-20 PROCEDURE — 97110 THERAPEUTIC EXERCISES: CPT

## 2023-07-20 NOTE — PROGRESS NOTES
Diagnosis:       Pelvic floor weakness  dyspareunia   L hip pain  Referring Provider: No ref. provider found  Date of Evaluation:    5/2/23    Precautions:  None Next MD visit:   none scheduled  Date of Surgery: n/a   Insurance Primary/Secondary: [place] / N/A     # Auth Visits: 8            Subjective:    I am still having some leaking with coughing and laughing. Pain:  No pain today       Objective:     full relaxation post contraction today on both LA muscles. Assessment:      review of HEP today for form and breathing. Pt has just begun strengthening following decreasing the tone. Can feel relaxation post contractions today-        Goals:   1. Pt to be I with HEP- met   2. Pt to hold voiding 2-3 hours at a time  3. Pt to report no leakage post void- met  4. Pt to report no leakage on the way to the bathroom-  improved 6/27/23  Does feel with sneezing  5. Pt to demo 10 fast contractions in 10 secs once the LA muscle has decreased in tone   6.  Pt to demo 10 sec holds at 10 mv for improved strength and decreased UI    Plan:    cont with strengthening - bio feedback next appt   Date: 5/9/2023  TX#: 2/ 8  Date:  5/16/23          TX#: 3/8  Date: 5/30/23                TX#: 4/8  Date:      6/6/23          TX#: 5/8 Date: 6/13/23  Tx#: 6/8 6/23/23  7/8 8/8  7/5/23 1/3  7/20/23   MFR B   S/L piriformis STM   Roller at home  Piriformis  Happy baby  Psoas release   Abdulaziz stretch 2 x 30 secs  B    MFR B 10 mins LA  S/L R MFR LA 3 mins  Cat and camel   Abdulaziz stretch 2 x 30 secs    Prayer stretch 3 x 30 secs   MFR R LA   side lying LA and piriformis release  Standing prayer stretch   2 x 30 secs  Mobs R hip lateral and inf glides   L mobs inf and lateral glides 10 x each   Showed her how to do at home   10 x each direction      LA release R  S/L OI and LA  10 mins  5 second contraction  5 fast contractions  Happy baby 2 x 30 secs    Belt mobs B inf and lateral sides 10 x    Ball add with PF contractions  Band GTB 10 x with PF 10 x   Bridge 10 x   Clams 10 x 2  Bent leg fall outs 10 x     90/90 marching 10 x each   Cues for form  Re assessment 5 mins   GTB 10 x  2 abd  Ball add 10 x   Bridge 10 x with  hip abd   Bent leg fall outs 10x   Bridge with ball add squeezes 10 x   Clams GTB 10 x   90/90 marching 10 x  Bridge with marching 10 x   90 /90 10 x tapping   Reformer 3 cords 10 x 2 with PF contractions   Ball over head 10 x   Lateral side walking   Front walking with band 10 x each direction  [employer] and fire hydrants no band 10 x     Prayer stretch 2 x 30 secs  Mobs B hips inf and lateral glides 10 x each   Piriformis stretch 3 x 30 secs B      Ball add  10 x   GTB 10 x abd in hooklying  Bridge 10 x with marching and GTB   Clams GTB 10 x   Bridge 10  x   Review of 10 fast and 10 sec holds 10 reps    Lateral side walking GTB : some with PF and some without  Front walking GTB 10 x  Some with PF and some without   Reformer 3 cords 10 x 2 PF contractions     Piriformis stretch /HS 2 x 30 secs B   Add with belt 20 secs   Mobs B hips inf and lateral 10 x                                          HEP:  Cont with stretches- added law stretch today     Charges:   EX 3        Total Timed Treatment: 45 min  Total Treatment Time: 45 min

## 2023-08-03 ENCOUNTER — OFFICE VISIT (OUTPATIENT)
Dept: PHYSICAL THERAPY | Age: 43
End: 2023-08-03
Attending: PEDIATRICS
Payer: COMMERCIAL

## 2023-08-03 PROCEDURE — 97110 THERAPEUTIC EXERCISES: CPT

## 2023-08-03 NOTE — PROGRESS NOTES
Diagnosis:       Pelvic floor weakness  dyspareunia   L hip pain  Referring Provider: No ref. provider found  Date of Evaluation:    5/2/23    Precautions:  None Next MD visit:   none scheduled  Date of Surgery: n/a   Insurance Primary/Secondary: [place] / N/A     # Auth Visits: 8            Subjective:   stretching everyday and doing some strengthening every days not needed the dilator for over a week. Menstruating today. Pain:  No pain today       Objective:     full relaxation post contraction today on both LA muscles. Assessment:      pt is not having leaking at this time. Will cont with Her HEP at this time. Aware of when to increase dilator-        Goals:   1. Pt to be I with HEP- met   2. Pt to hold voiding 2-3 hours at a time- met   3. Pt to report no leakage post void- met  4. Pt to report no leakage on the way to the bathroom-  met   Does feel with sneezing  5. Pt to demo 10 fast contractions in 10 secs once the LA muscle has decreased in tone - improved   6.  Pt to demo 10 sec holds at 10 mv for improved strength and decreased UI- improved    Plan:     discharge with HEP - pt has met most goals at this time   Date: 5/9/2023  TX#: 2/ 8  Date:  5/16/23          TX#: 3/8  Date: 5/30/23                TX#: 4/8  Date:      6/6/23          TX#: 5/8 Date: 6/13/23  Tx#: 6/8 6/23/23  7/8 8/8  7/5/23 1/3  7/20/23 2/3  8/3/23   MFR B   S/L piriformis STM   Roller at home  Piriformis  Happy baby  Psoas release   Abdulaziz stretch 2 x 30 secs  B    MFR B 10 mins LA  S/L R MFR LA 3 mins  Cat and camel   Abdulaziz stretch 2 x 30 secs    Prayer stretch 3 x 30 secs   MFR R LA   side lying LA and piriformis release  Standing prayer stretch   2 x 30 secs  Mobs R hip lateral and inf glides   L mobs inf and lateral glides 10 x each   Showed her how to do at home   10 x each direction      LA release R  S/L OI and LA  10 mins  5 second contraction  5 fast contractions  Happy baby 2 x 30 secs    Belt mobs B inf and lateral sides 10 x    Ball add with PF contractions  Band GTB 10 x with PF 10 x   Bridge 10 x   Clams 10 x 2  Bent leg fall outs 10 x     90/90 marching 10 x each   Cues for form  Re assessment 5 mins   GTB 10 x  2 abd  Ball add 10 x   Bridge 10 x with  hip abd   Bent leg fall outs 10x   Bridge with ball add squeezes 10 x   Clams GTB 10 x   90/90 marching 10 x  Bridge with marching 10 x   90 /90 10 x tapping   Reformer 3 cords 10 x 2 with PF contractions   Ball over head 10 x   Lateral side walking   Front walking with band 10 x each direction  [employer] and fire hydrants no band 10 x     Prayer stretch 2 x 30 secs  Mobs B hips inf and lateral glides 10 x each   Piriformis stretch 3 x 30 secs B      Ball add  10 x   GTB 10 x abd in hooklying  Bridge 10 x with marching and GTB   Clams GTB 10 x   Bridge 10  x   Review of 10 fast and 10 sec holds 10 reps    Lateral side walking GTB : some with PF and some without  Front walking GTB 10 x  Some with PF and some without   Reformer 3 cords 10 x 2 PF contractions     Piriformis stretch /HS 2 x 30 secs B   Add with belt 20 secs   Mobs B hips inf and lateral 10 x          90/90 ball isometrics 10 x on each side  Hold 10 # above head 5 reps     Clams GTB 10 x ankles elevated each side  With PF     Hold weights for marching bridge  10 x #     Mobs B hips inf/lateral     Standing piriformis stretch 2 x 30 secs  HS/ITB/ add 20 secs x 3                                       HEP:  Cont with stretches- added law stretch today     Charges:   EX 3        Total Timed Treatment: 45 min  Total Treatment Time: 45 min

## (undated) DEVICE — LARGE, DISPOSABLE ALEXIS O C-SECTION PROTECTOR - RETRACTOR: Brand: ALEXIS ® O C-SECTION PROTECTOR - RETRACTOR

## (undated) DEVICE — ABSORBABLE HEMOSTAT (OXIDIZED REGENERATED CELLULOSE, U.S.P.): Brand: SURGICEL

## (undated) DEVICE — EXTRA LARGE, DISPOSABLE C-SECTION PROTECTOR - RETRACTOR: Brand: ALEXIS ® O C-SECTION PROTECTOR - RETRACTOR

## (undated) NOTE — ED AVS SNAPSHOT
Ms. Alex Saldaña   MRN: [id number]    Department:  [facility] Emergency Department   Date of Visit:  12/25/2017           Disclosure     Insurance plans vary and the physician(s) referred by the ER may not be covered by your plan.  Please cont tell this physician (or your personal doctor if your instructions are to return to your personal doctor) about any new or lasting problems. The primary care or specialist physician will see patients referred from the [facility] Emergency Department.  Bennett Tinoco